# Patient Record
Sex: FEMALE | Race: WHITE | Employment: OTHER | URBAN - NONMETROPOLITAN AREA
[De-identification: names, ages, dates, MRNs, and addresses within clinical notes are randomized per-mention and may not be internally consistent; named-entity substitution may affect disease eponyms.]

---

## 2017-07-27 ENCOUNTER — INITIAL CONSULT (OUTPATIENT)
Dept: PULMONOLOGY | Age: 57
End: 2017-07-27
Payer: COMMERCIAL

## 2017-07-27 VITALS
BODY MASS INDEX: 25.6 KG/M2 | HEIGHT: 70 IN | HEART RATE: 56 BPM | OXYGEN SATURATION: 98 % | WEIGHT: 178.8 LBS | SYSTOLIC BLOOD PRESSURE: 108 MMHG | DIASTOLIC BLOOD PRESSURE: 62 MMHG

## 2017-07-27 DIAGNOSIS — F51.01 PRIMARY INSOMNIA: Primary | ICD-10-CM

## 2017-07-27 DIAGNOSIS — G47.33 OSA (OBSTRUCTIVE SLEEP APNEA): ICD-10-CM

## 2017-07-27 DIAGNOSIS — G47.19 EXCESSIVE DAYTIME SLEEPINESS: ICD-10-CM

## 2017-07-27 DIAGNOSIS — R06.83 SNORING: ICD-10-CM

## 2017-07-27 DIAGNOSIS — G47.8 NON-RESTORATIVE SLEEP: ICD-10-CM

## 2017-07-27 DIAGNOSIS — G47.10 HYPERSOMNIA: ICD-10-CM

## 2017-07-27 DIAGNOSIS — R53.83 FATIGUE, UNSPECIFIED TYPE: ICD-10-CM

## 2017-07-27 PROCEDURE — 99204 OFFICE O/P NEW MOD 45 MIN: CPT | Performed by: INTERNAL MEDICINE

## 2017-08-17 ENCOUNTER — HOSPITAL ENCOUNTER (OUTPATIENT)
Dept: SLEEP CENTER | Age: 57
Discharge: HOME | End: 2017-08-17
Payer: COMMERCIAL

## 2017-08-17 VITALS
RESPIRATION RATE: 98 BRPM | WEIGHT: 178 LBS | SYSTOLIC BLOOD PRESSURE: 108 MMHG | DIASTOLIC BLOOD PRESSURE: 62 MMHG | HEART RATE: 56 BPM | BODY MASS INDEX: 25.48 KG/M2 | HEIGHT: 70 IN

## 2017-08-17 DIAGNOSIS — G47.33 OSA (OBSTRUCTIVE SLEEP APNEA): ICD-10-CM

## 2017-08-17 DIAGNOSIS — G47.8 NON-RESTORATIVE SLEEP: ICD-10-CM

## 2017-08-17 DIAGNOSIS — R06.83 SNORING: ICD-10-CM

## 2017-08-17 DIAGNOSIS — G47.10 HYPERSOMNIA: ICD-10-CM

## 2017-08-17 DIAGNOSIS — R53.83 FATIGUE, UNSPECIFIED TYPE: ICD-10-CM

## 2017-08-17 DIAGNOSIS — F51.01 PRIMARY INSOMNIA: ICD-10-CM

## 2017-08-17 DIAGNOSIS — G47.19 EXCESSIVE DAYTIME SLEEPINESS: ICD-10-CM

## 2017-08-17 PROCEDURE — 95810 POLYSOM 6/> YRS 4/> PARAM: CPT

## 2017-08-18 LAB — STATUS: NORMAL

## 2017-08-18 PROCEDURE — 95810 POLYSOM 6/> YRS 4/> PARAM: CPT | Performed by: INTERNAL MEDICINE

## 2017-08-24 ENCOUNTER — OFFICE VISIT (OUTPATIENT)
Dept: PULMONOLOGY | Age: 57
End: 2017-08-24
Payer: COMMERCIAL

## 2017-08-24 VITALS
HEART RATE: 72 BPM | HEIGHT: 70 IN | DIASTOLIC BLOOD PRESSURE: 70 MMHG | OXYGEN SATURATION: 98 % | WEIGHT: 178 LBS | SYSTOLIC BLOOD PRESSURE: 92 MMHG | BODY MASS INDEX: 25.48 KG/M2

## 2017-08-24 DIAGNOSIS — F51.01 PRIMARY INSOMNIA: Primary | ICD-10-CM

## 2017-08-24 PROCEDURE — 99212 OFFICE O/P EST SF 10 MIN: CPT | Performed by: INTERNAL MEDICINE

## 2017-12-22 ENCOUNTER — OFFICE VISIT (OUTPATIENT)
Dept: PSYCHOLOGY | Age: 57
End: 2017-12-22
Payer: COMMERCIAL

## 2017-12-22 DIAGNOSIS — F51.01 PRIMARY INSOMNIA: Chronic | ICD-10-CM

## 2017-12-22 PROBLEM — G47.00 INSOMNIA: Chronic | Status: ACTIVE | Noted: 2017-12-22

## 2017-12-22 PROCEDURE — 90791 PSYCH DIAGNOSTIC EVALUATION: CPT | Performed by: PSYCHOLOGIST

## 2017-12-22 ASSESSMENT — ANXIETY QUESTIONNAIRES
4. TROUBLE RELAXING: 1-SEVERAL DAYS
GAD7 TOTAL SCORE: 7
6. BECOMING EASILY ANNOYED OR IRRITABLE: 1-SEVERAL DAYS
3. WORRYING TOO MUCH ABOUT DIFFERENT THINGS: 1-SEVERAL DAYS
5. BEING SO RESTLESS THAT IT IS HARD TO SIT STILL: 1-SEVERAL DAYS
7. FEELING AFRAID AS IF SOMETHING AWFUL MIGHT HAPPEN: 0-NOT AT ALL SURE
2. NOT BEING ABLE TO STOP OR CONTROL WORRYING: 1-SEVERAL DAYS
1. FEELING NERVOUS, ANXIOUS, OR ON EDGE: 2-OVER HALF THE DAYS

## 2017-12-22 ASSESSMENT — PATIENT HEALTH QUESTIONNAIRE - PHQ9
6. FEELING BAD ABOUT YOURSELF - OR THAT YOU ARE A FAILURE OR HAVE LET YOURSELF OR YOUR FAMILY DOWN: 1
SUM OF ALL RESPONSES TO PHQ9 QUESTIONS 1 & 2: 3
2. FEELING DOWN, DEPRESSED OR HOPELESS: 2
8. MOVING OR SPEAKING SO SLOWLY THAT OTHER PEOPLE COULD HAVE NOTICED. OR THE OPPOSITE, BEING SO FIGETY OR RESTLESS THAT YOU HAVE BEEN MOVING AROUND A LOT MORE THAN USUAL: 0
5. POOR APPETITE OR OVEREATING: 3
1. LITTLE INTEREST OR PLEASURE IN DOING THINGS: 1
4. FEELING TIRED OR HAVING LITTLE ENERGY: 3
7. TROUBLE CONCENTRATING ON THINGS, SUCH AS READING THE NEWSPAPER OR WATCHING TELEVISION: 2
SUM OF ALL RESPONSES TO PHQ QUESTIONS 1-9: 16
10. IF YOU CHECKED OFF ANY PROBLEMS, HOW DIFFICULT HAVE THESE PROBLEMS MADE IT FOR YOU TO DO YOUR WORK, TAKE CARE OF THINGS AT HOME, OR GET ALONG WITH OTHER PEOPLE: 1
9. THOUGHTS THAT YOU WOULD BE BETTER OFF DEAD, OR OF HURTING YOURSELF: 1
3. TROUBLE FALLING OR STAYING ASLEEP: 3

## 2017-12-22 NOTE — PROGRESS NOTES
[facility]  [address]  [address] [place] [address]  Bl[address]  Dept: [phone]  Dept Fax: [phone]: [phone]    Patient:  Daniel Terry  Visit Date: 12/22/2017  Referring Provider:   No ref. provider found    SUBJECTIVE DATA     CHIEF COMPLAINT:    Chief Complaint   Patient presents with    Anxiety    Insomnia    Depression    New Patient       History obtained from: patient    HISTORY OF PRESENT ILLNESS:    Daniel Terry is a 61 y.o. female who presents to the office for insomnia of long duration. She has a number of stressors, which we discussed.     HPI    REVIEW OF SYSTEMS:    Review of Systems    PSYCHIATRIC HISTORY:    Patient has had prior care with the following:    [] Psychiatrist    [] Psychologist    [] Other Therapist    [x] None    Patient reports 0 psych hospital admissions    Past psychiatric medications include: Lunesta, Ambien, briefly on antidepressant (not known which)    Adverse reactions from psychotropic medications:    Feeling groggy    Additional Neurological and Psychological Symptoms         Chronic pain: No      Obsessions and Compulsions: No     Dissociation:  No     History of Concussion: No     Sleep Problems:  Yes     [x] Can't fall asleep    [x] Can't stay asleep    [x] Snoring    [x] Restless leg    SOCIAL HISTORY:  Patient was born in [address] [place] [address] and raised by her biological parents      Social History     Social History    Marital status:  30+ years     Spouse name: Justin Mayfield Number of children: 3 daughters    Years of education: N/A     Occupational History    Day care worker, 25 years     Social History Main Topics    Smoking status: Never Smoker    Smokeless tobacco: Never Used    Alcohol use 2.4 oz/week     4 Glasses of wine per week      Comment: occasionally    Drug use: No    Sexual activity: Not on file     Other Topics Concern    Not on file     Social History Narrative    No narrative on file constricted, dysthymic and sad      Affect:  blunted and flat      Appearance:  Normal   Activity:  Normal   Gait/Posture: Normal   Speech:  soft   Thought Process:  within normal limits   Thought Content: Within Normal Limits   Cognition:  Normal   Memory: Normal   Insight:  Normal   Judgment: Normal   Suicidal Ideations: Denies Suicidal Ideations   Homicidal Ideations: Denies Homicidal Ideations   Medication Side Effects: None Reported       Attention Span Within Normal Limits     Screenings Completed in This Encounter:   HEIDI-7  Feeling nervious, anxious, or on edge: 2-Over half the days  Not able to stop or control worryin-Several days  Worrying too much about different things: 1-Several days  Trouble relaxin-Several days  Being so restless that it's hard to sit still: 1-Several days  Becoming easily annoyed or irritable: 1-Several days  Feeling afraid as if something awful might happen: 0-Not at all sure  HEIDI-7 Total Score: 7    PHQ-2 Over the past 2 weeks, how often have you been bothered by any of the following problems? Little interest or pleasure in doing things: Several days  Feeling down, depressed, or hopeless: More than half the days  PHQ-2 Score: 3  PHQ-9 Over the past 2 weeks, how often have you been bothered by any of the following problems? Trouble falling or staying asleep, or sleeping too much: Nearly every day  Feeling tired or having little energy: Nearly every day  Poor appetite or overeating: Nearly every day (\"comfort eating\")  Feeling bad about yourself - or that you are a failure or have let yourself or your family down: Several days  Trouble concentrating on things, such as reading the newspaper or watching television: More than half the days  Moving or speaking so slowly that other people could have noticed.  Or the opposite - being so fidgety or restless that you have been moving around a lot more than usual: Not at all  Thoughts that you would be better off dead, or of hurting

## 2018-02-02 ENCOUNTER — OFFICE VISIT (OUTPATIENT)
Dept: PSYCHOLOGY | Age: 58
End: 2018-02-02
Payer: COMMERCIAL

## 2018-02-02 DIAGNOSIS — F51.01 PRIMARY INSOMNIA: Primary | Chronic | ICD-10-CM

## 2018-02-02 PROCEDURE — 90834 PSYTX W PT 45 MINUTES: CPT | Performed by: PSYCHOLOGIST

## 2018-02-02 NOTE — PROGRESS NOTES
[facility]  [address]  [address]  Dept: [phone]  Dept Fax: [phone]  Loc: [phone]    Patient:  Nano Vides  Visit Date: 2/2/2018      SUBJECTIVE DATA     CHIEF COMPLAINT:    Chief Complaint   Patient presents with    Insomnia       Patient update:  Patient reports the following since our last session: She has been charting her sleep and reading the 7-bites book. Notices that she has been getting more hours of sleep per night on average, but on nights when she has conflict with her , she can go for five or six hours without sleeping, before sleeping just two or three hours. Barriers to optimal progress (depression, anxiety, fatigue, insomnia focus, stress at home      OBJECTIVE DATA     Physical Exam:    Mental Status Evaluation:   Orientation: Alert, oriented, thought content appropriate   Mood:. anxious      Affect:  constricted and anxious      Appearance:  Normal   Activity:  Normal   Gait/Posture: Normal   Speech:  Normal   Thought Process:  within normal limits   Thought Content: Within Normal Limits   Cognition:  Normal   Memory: Normal   Insight:  fair   Judgment: Normal   Suicidal Ideations: Denies Suicidal Ideations   Homicidal Ideations: Denies Homicidal Ideations   Medication Side Effects: None Reported       Attention Span Within Normal Limits     Screenings Completed in This Encounter:                                   Interventions Completed in this Encounter:  Discussion of emotional impacts of chronic insomnia, and how it has affected the patient's life., Discussion of [facility] Sleep Diary (JHSD) and review of sample patient charts., Introduction to fundamental principles of Cognitive Behavioral Therapy for Insomnia (CBT-I). , Work on [facility] of insomnia development and conditioning as they apply to chronic insomnia., Relaxation training in session.  and Discussion of melatonin, bright light therapy, and altering blue light screen exposure on all devices. DIAGNOSIS AND ASSESSMENT DATA     DIAGNOSIS:   1. Primary insomnia        PLAN   Follow-up:  No Follow-up on file. Go to Patient Instructions section for patient homework    Homework assignment before next session:    [] Put blue light evelio or filter on all computers and devices as discussed in session. Popular choices: \"F.lux\" for computers, \"Night Shift\" for Apple devices, \"Twilight\" or other apps for android devices. [] If you really want to watch TV before bed, it's better to do that in a room that is not your bedroom. Also, position yourself to get less of the blue light, as we talked about in session. [] Preferably, in the last hours before bedtime, turn off electronic devices and read, look at magazines, listen to music, do stretching, etc.    [] Practice deep breathing 1-2 times per day for 15 minutes, as demonstrated in session, and/or:    [] Go to [facility]\" web site and begin practicing with their free meditation podcasts    [x] Notice any thoughts or events that you think increase your insomnia, your anxiety, or your depression. You can also use \"Moodtracker. com\" or similar apps to monitor your mood, sleep, and anxiety. [] Go to \"[facility]\" web site, open up the \"Workbooks\" tab, and select a problem from the list that suits your needs. Review the first module. [] Begin to track your physical activity. Even five minutes per day will be helpful. [x] Talk with your physician about whether it's OK to start fish oil (burpless) or flax oil, 1000 to 1200 mg per day    [] Consider starting Bright Light Therapy for 30 minutes in the morning.  This works to consolidate and deepen your sleep, and improve your alertness during the day    [] Try \"Expressive Writing\" using the guidelines on the handout    [x] If you feel ready, start reading one of the recommended insomnia books as we discussed in session    [] If you feel suicidal or have thoughts of harming yourself or others, please call 911 or proceed to the nearest emergency department immediately. Total time spent with patient:  50 minutes    All questions about treatment plan answered. Patient instructed to go immediately to the emergency room and/or call 911 if any suicidal or homicidal ideations. Patient stated understanding and is agreeable to treatment and crisis plan.         Provider Signature:  Electronically signed by Alanna Alvarenga, PhD on 2/2/2018 at 9:07 AM

## 2018-02-16 ENCOUNTER — OFFICE VISIT (OUTPATIENT)
Dept: PSYCHOLOGY | Age: 58
End: 2018-02-16
Payer: COMMERCIAL

## 2018-02-16 DIAGNOSIS — F51.01 PRIMARY INSOMNIA: Primary | Chronic | ICD-10-CM

## 2018-02-16 PROCEDURE — 90837 PSYTX W PT 60 MINUTES: CPT | Performed by: PSYCHOLOGIST

## 2018-02-16 NOTE — PROGRESS NOTES
[facility]  [address]  [address] [place] [address] [address] [address]  Dept: [phone]  Dept Fax: [phone]  Loc: [phone]    Patient:  Ailin Villalobos  Visit Date: 2/16/2018      SUBJECTIVE DATA     CHIEF COMPLAINT:    Chief Complaint   Patient presents with    Anxiety    Insomnia       Patient update:  Patient reports the following since our last session: She has been charting her sleep and reading the FRUCT book. Sleep remains very fragmented. Much of the difficulty with sleep relates to interpersonal stressors, which leave her feeling frustrated and tense. Extended session due to discussion of these issues. Barriers to optimal progress (depression, anxiety, fatigue, insomnia focus, stress at home, constantly feeling criticized, lack of self-assertion)      OBJECTIVE DATA     Physical Exam:    Mental Status Evaluation:   Orientation: Alert, oriented, thought content appropriate   Mood:. anxious      Affect:  constricted and anxious      Appearance:  Normal   Activity:  Normal   Gait/Posture: Normal   Speech:  Normal   Thought Process:  within normal limits   Thought Content: Within Normal Limits   Cognition:  Normal   Memory: Normal   Insight:  fair   Judgment: Normal   Suicidal Ideations: Denies Suicidal Ideations   Homicidal Ideations: Denies Homicidal Ideations   Medication Side Effects: None Reported       Attention Span Within Normal Limits     Screenings Completed in This Encounter:                                   Interventions Completed in this Encounter:  Discussion of emotional impacts of chronic insomnia, and how it has affected the patient's life., Discussion of [facility] Sleep Diary (JHSD) and review of sample patient charts., Introduction to fundamental principles of Cognitive Behavioral Therapy for Insomnia (CBT-I). , Work on [facility] of insomnia development and conditioning as they apply to chronic insomnia., Relaxation training

## 2018-03-28 ENCOUNTER — OFFICE VISIT (OUTPATIENT)
Dept: PSYCHOLOGY | Age: 58
End: 2018-03-28
Payer: COMMERCIAL

## 2018-03-28 DIAGNOSIS — F51.01 PRIMARY INSOMNIA: Primary | Chronic | ICD-10-CM

## 2018-03-28 PROCEDURE — 90837 PSYTX W PT 60 MINUTES: CPT | Performed by: PSYCHOLOGIST

## 2018-03-28 NOTE — PROGRESS NOTES
[facility]  [address]  [address] [place] [address] [address] [address]  Dept: [phone]  Dept Fax: [phone]  Loc: [phone]    Patient:  Vira Smith  Visit Date: 3/28/2018      SUBJECTIVE DATA     CHIEF COMPLAINT:    Chief Complaint   Patient presents with    Anxiety    Insomnia       Patient update:  Patient reports the following since our last session: has had a bad UTI with incontinence, frequency, urgency, fever, chills, vertigo, falling down, nausea and vomiting. Has been trying to deal with it with cranberry juice and Tylenol, rather than going to see Dr. Lauren Quan. Agrees to set up an appointment with Dr. Lauren Quan to deal with the UTI. Also wondering whether the adjustable Sleep Number bed would be good for her, with her history of snoring and pain and insomnia. Lots of difficulty with self-assertion. We attempted to do a brief role play of her sticking up for something that is important to her, and she became immediately flushed, overwhelmed, and tearful. Wants to spend some time with daughter Wilton Hou seldom do fun things together, although they both enjoy doing the Sloan Ing series on the computer. Has difficulty identifying ways to increase her positive activities. A major barrier to doing things is her 's insistence that they do things together when she is not working. Has joined [employer]--wants to go 2-3 times a week, for about 60-90 minutes. Going to the [facility] until then. Barriers to optimal progress (depression, anxiety, fatigue, insomnia focus, stress at home, constantly feeling criticized, lack of self-assertion).       OBJECTIVE DATA     Physical Exam:    Mental Status Evaluation:   Orientation: Alert, oriented, thought content appropriate   Mood:. anxious      Affect:  constricted and anxious, tearful      Appearance:  Normal   Activity:  Normal   Gait/Posture: Normal   Speech:  Normal   Thought Process:  within normal limits   Thought Content: Within Normal Limits   Cognition:  Normal   Memory: Normal   Insight:  fair   Judgment: Normal   Suicidal Ideations: Denies Suicidal Ideations   Homicidal Ideations: Denies Homicidal Ideations   Medication Side Effects: None Reported       Attention Span Within Normal Limits     Screenings Completed in This Encounter:                                   Interventions Completed in this Encounter:  Discussion of emotional impacts of chronic insomnia, and how it has affected the patient's life., Discussion of [facility] Sleep Diary (JHSD) and review of sample patient charts., Introduction to fundamental principles of Cognitive Behavioral Therapy for Insomnia (CBT-I). , Work on [facility] of insomnia development and conditioning as they apply to chronic insomnia., Relaxation training in session. and Discussion of melatonin, bright light therapy, and altering blue light screen exposure on all devices. Also discussion of importance of self-care, including aerobic exercise and having a balance of positive activities in one's life. DIAGNOSIS AND ASSESSMENT DATA     DIAGNOSIS:   1. Primary insomnia        PLAN   Follow-up:  No Follow-up on file. Go to Patient Instructions section for patient homework    Homework assignment before next session:    [] Put blue light evelio or filter on all computers and devices as discussed in session. Popular choices: \"F.lux\" for computers, \"Night Shift\" for Apple devices, \"Twilight\" or other apps for android devices. [] If you really want to watch TV before bed, it's better to do that in a room that is not your bedroom. Also, position yourself to get less of the blue light, as we talked about in session.      [] Preferably, in the last hours before bedtime, turn off electronic devices and read, look at magazines, listen to music, do stretching, etc.    [] Practice deep breathing 1-2 times per day for 15 minutes, as demonstrated in session,

## 2018-04-11 ENCOUNTER — OFFICE VISIT (OUTPATIENT)
Dept: PSYCHOLOGY | Age: 58
End: 2018-04-11
Payer: COMMERCIAL

## 2018-04-11 DIAGNOSIS — F51.01 PRIMARY INSOMNIA: Primary | Chronic | ICD-10-CM

## 2018-04-11 PROCEDURE — 90834 PSYTX W PT 45 MINUTES: CPT | Performed by: PSYCHOLOGIST

## 2018-04-11 RX ORDER — PREDNISONE 10 MG/1
TABLET ORAL
COMMUNITY
Start: 2018-04-10

## 2018-04-11 RX ORDER — LEVOFLOXACIN 500 MG/1
TABLET, FILM COATED ORAL
COMMUNITY
Start: 2018-04-10 | End: 2021-05-21

## 2018-04-11 RX ORDER — OSELTAMIVIR PHOSPHATE 75 MG/1
CAPSULE ORAL
COMMUNITY
Start: 2018-03-28

## 2018-04-11 RX ORDER — MINOCYCLINE HYDROCHLORIDE 100 MG/1
CAPSULE ORAL
COMMUNITY
Start: 2018-01-24 | End: 2021-05-21

## 2018-04-17 ENCOUNTER — HOSPITAL ENCOUNTER (OUTPATIENT)
Dept: GENERAL RADIOLOGY | Age: 58
Discharge: HOME | End: 2018-04-17
Payer: COMMERCIAL

## 2018-04-17 ENCOUNTER — HOSPITAL ENCOUNTER (OUTPATIENT)
Age: 58
Discharge: HOME | End: 2018-04-17
Payer: COMMERCIAL

## 2018-04-17 DIAGNOSIS — R05.9 COUGH: ICD-10-CM

## 2018-04-17 PROCEDURE — 71046 X-RAY EXAM CHEST 2 VIEWS: CPT

## 2018-08-20 ENCOUNTER — HOSPITAL ENCOUNTER (OUTPATIENT)
Dept: MAMMOGRAPHY | Age: 58
Discharge: HOME | End: 2018-08-20
Payer: COMMERCIAL

## 2018-08-20 DIAGNOSIS — Z12.39 SCREENING BREAST EXAMINATION: ICD-10-CM

## 2018-08-20 PROCEDURE — 77067 SCR MAMMO BI INCL CAD: CPT

## 2019-09-19 ENCOUNTER — HOSPITAL ENCOUNTER (OUTPATIENT)
Dept: MAMMOGRAPHY | Age: 59
Discharge: HOME | End: 2019-09-19
Payer: COMMERCIAL

## 2019-09-19 DIAGNOSIS — Z12.39 BREAST SCREENING: ICD-10-CM

## 2019-09-19 LAB
EKG ATRIAL RATE: 47 BPM
EKG P AXIS: 57 DEGREES
EKG P-R INTERVAL: 180 MS
EKG Q-T INTERVAL: 444 MS
EKG QRS DURATION: 78 MS
EKG QTC CALCULATION (BAZETT): 392 MS
EKG R AXIS: 61 DEGREES
EKG T AXIS: 42 DEGREES
EKG VENTRICULAR RATE: 47 BPM

## 2019-09-19 PROCEDURE — 93005 ELECTROCARDIOGRAM TRACING: CPT | Performed by: FAMILY MEDICINE

## 2019-09-19 PROCEDURE — 77063 BREAST TOMOSYNTHESIS BI: CPT

## 2019-10-31 ENCOUNTER — HOSPITAL ENCOUNTER (OUTPATIENT)
Dept: WOMENS IMAGING | Age: 59
Discharge: HOME | End: 2019-10-31
Payer: COMMERCIAL

## 2019-10-31 ENCOUNTER — HOSPITAL ENCOUNTER (OUTPATIENT)
Dept: INTERVENTIONAL RADIOLOGY/VASCULAR | Age: 59
Discharge: HOME | End: 2019-10-31
Payer: COMMERCIAL

## 2019-10-31 DIAGNOSIS — Z13.6 ENCOUNTER FOR SCREENING FOR VASCULAR DISEASE: ICD-10-CM

## 2019-10-31 DIAGNOSIS — Z13.820 OSTEOPOROSIS SCREENING: ICD-10-CM

## 2019-10-31 PROCEDURE — 9900000021 US VASCULAR SCREENING

## 2019-10-31 PROCEDURE — 77080 DXA BONE DENSITY AXIAL: CPT

## 2021-02-09 ENCOUNTER — HOSPITAL ENCOUNTER (OUTPATIENT)
Dept: MAMMOGRAPHY | Age: 61
Discharge: HOME | End: 2021-02-09
Payer: COMMERCIAL

## 2021-02-09 DIAGNOSIS — Z12.39 SCREENING BREAST EXAMINATION: ICD-10-CM

## 2021-02-09 PROCEDURE — 77063 BREAST TOMOSYNTHESIS BI: CPT

## 2022-02-10 ENCOUNTER — HOSPITAL ENCOUNTER (OUTPATIENT)
Dept: MAMMOGRAPHY | Age: 62
Discharge: HOME | End: 2022-01-19
Payer: COMMERCIAL

## 2022-02-10 DIAGNOSIS — Z12.39 BREAST SCREENING: ICD-10-CM

## 2022-02-10 PROCEDURE — 77063 BREAST TOMOSYNTHESIS BI: CPT

## 2023-03-13 ENCOUNTER — HOSPITAL ENCOUNTER (OUTPATIENT)
Dept: MAMMOGRAPHY | Age: 63
Discharge: HOME | End: 2023-03-13
Payer: COMMERCIAL

## 2023-03-13 ENCOUNTER — HOSPITAL ENCOUNTER (OUTPATIENT)
Age: 63
Discharge: HOME | End: 2023-03-13
Payer: COMMERCIAL

## 2023-03-13 ENCOUNTER — HOSPITAL ENCOUNTER (OUTPATIENT)
Dept: GENERAL RADIOLOGY | Age: 63
Discharge: HOME | End: 2023-03-13
Payer: COMMERCIAL

## 2023-03-13 DIAGNOSIS — M79.671 RIGHT FOOT PAIN: ICD-10-CM

## 2023-03-13 DIAGNOSIS — Z12.39 SCREENING BREAST EXAMINATION: ICD-10-CM

## 2023-03-13 PROCEDURE — 77067 SCR MAMMO BI INCL CAD: CPT

## 2023-03-13 PROCEDURE — 73630 X-RAY EXAM OF FOOT: CPT

## 2023-03-21 ENCOUNTER — HOSPITAL ENCOUNTER (OUTPATIENT)
Dept: WOMENS IMAGING | Age: 63
Discharge: HOME | End: 2023-03-21
Payer: COMMERCIAL

## 2023-03-21 ENCOUNTER — HOSPITAL ENCOUNTER (OUTPATIENT)
Dept: ULTRASOUND IMAGING | Age: 63
Discharge: HOME | End: 2023-03-21
Payer: COMMERCIAL

## 2023-03-21 DIAGNOSIS — Z78.0 POSTMENOPAUSAL: ICD-10-CM

## 2023-03-21 DIAGNOSIS — M85.80 OSTEOPENIA, UNSPECIFIED LOCATION: ICD-10-CM

## 2023-03-21 DIAGNOSIS — E04.9 ENLARGED THYROID: ICD-10-CM

## 2023-03-21 PROCEDURE — 76536 US EXAM OF HEAD AND NECK: CPT

## 2023-03-21 PROCEDURE — 77080 DXA BONE DENSITY AXIAL: CPT

## 2023-04-10 PROBLEM — E04.2 MULTIPLE THYROID NODULES: Status: ACTIVE | Noted: 2023-04-10

## 2023-04-10 PROBLEM — R93.89 ABNORMAL THYROID ULTRASOUND: Status: ACTIVE | Noted: 2023-04-10

## 2023-10-03 ENCOUNTER — HOSPITAL ENCOUNTER (OUTPATIENT)
Dept: GENERAL RADIOLOGY | Age: 63
Discharge: HOME | End: 2023-10-03
Payer: COMMERCIAL

## 2023-10-03 ENCOUNTER — HOSPITAL ENCOUNTER (OUTPATIENT)
Age: 63
Discharge: HOME | End: 2023-10-03
Payer: COMMERCIAL

## 2023-10-03 DIAGNOSIS — R20.0 NUMBNESS AND TINGLING OF BOTH FEET: ICD-10-CM

## 2023-10-03 DIAGNOSIS — M54.42 ACUTE MIDLINE LOW BACK PAIN WITH LEFT-SIDED SCIATICA: ICD-10-CM

## 2023-10-03 DIAGNOSIS — R20.2 NUMBNESS AND TINGLING OF BOTH FEET: ICD-10-CM

## 2023-10-03 PROCEDURE — 72100 X-RAY EXAM L-S SPINE 2/3 VWS: CPT

## 2024-02-27 ENCOUNTER — HOSPITAL ENCOUNTER (OUTPATIENT)
Age: 64
Discharge: HOME | End: 2024-02-27
Payer: COMMERCIAL

## 2024-02-27 ENCOUNTER — HOSPITAL ENCOUNTER (OUTPATIENT)
Dept: GENERAL RADIOLOGY | Age: 64
Discharge: HOME | End: 2024-02-27
Payer: COMMERCIAL

## 2024-02-27 DIAGNOSIS — M25.561 ACUTE PAIN OF BOTH KNEES: ICD-10-CM

## 2024-02-27 DIAGNOSIS — M25.562 ACUTE PAIN OF BOTH KNEES: ICD-10-CM

## 2024-02-27 PROCEDURE — 73564 X-RAY EXAM KNEE 4 OR MORE: CPT

## 2024-06-24 ENCOUNTER — HOSPITAL ENCOUNTER (OUTPATIENT)
Dept: MAMMOGRAPHY | Age: 64
Discharge: HOME | End: 2024-06-24
Payer: COMMERCIAL

## 2024-06-24 VITALS — WEIGHT: 224 LBS | HEIGHT: 70 IN | BODY MASS INDEX: 32.07 KG/M2

## 2024-06-24 DIAGNOSIS — Z12.39 BREAST SCREENING: ICD-10-CM

## 2024-06-24 PROCEDURE — 77063 BREAST TOMOSYNTHESIS BI: CPT

## 2024-07-17 ENCOUNTER — LAB (OUTPATIENT)
Dept: LAB | Age: 64
End: 2024-07-17

## 2024-07-25 LAB — CYTOLOGY THIN PREP PAP: NORMAL

## 2024-09-12 ENCOUNTER — OFFICE VISIT (OUTPATIENT)
Dept: PULMONOLOGY | Age: 64
End: 2024-09-12
Payer: MEDICARE

## 2024-09-12 VITALS
HEART RATE: 62 BPM | WEIGHT: 223.2 LBS | OXYGEN SATURATION: 96 % | HEIGHT: 69 IN | SYSTOLIC BLOOD PRESSURE: 108 MMHG | TEMPERATURE: 97.5 F | DIASTOLIC BLOOD PRESSURE: 62 MMHG | BODY MASS INDEX: 33.06 KG/M2

## 2024-09-12 DIAGNOSIS — R40.0 DAYTIME SLEEPINESS: Primary | ICD-10-CM

## 2024-09-12 DIAGNOSIS — R06.83 HABITUAL SNORING: ICD-10-CM

## 2024-09-12 DIAGNOSIS — E66.9 CLASS 1 OBESITY WITH BODY MASS INDEX (BMI) OF 30.0 TO 30.9 IN ADULT, UNSPECIFIED OBESITY TYPE, UNSPECIFIED WHETHER SERIOUS COMORBIDITY PRESENT: ICD-10-CM

## 2024-09-12 DIAGNOSIS — Z72.820 POOR SLEEP: ICD-10-CM

## 2024-09-12 PROCEDURE — G8427 DOCREV CUR MEDS BY ELIG CLIN: HCPCS | Performed by: NURSE PRACTITIONER

## 2024-09-12 PROCEDURE — G8417 CALC BMI ABV UP PARAM F/U: HCPCS | Performed by: NURSE PRACTITIONER

## 2024-09-12 PROCEDURE — 1036F TOBACCO NON-USER: CPT | Performed by: NURSE PRACTITIONER

## 2024-09-12 PROCEDURE — 3017F COLORECTAL CA SCREEN DOC REV: CPT | Performed by: NURSE PRACTITIONER

## 2024-09-12 PROCEDURE — 99214 OFFICE O/P EST MOD 30 MIN: CPT | Performed by: NURSE PRACTITIONER

## 2024-09-12 RX ORDER — ZOLPIDEM TARTRATE 10 MG/1
10 TABLET ORAL ONCE
Qty: 1 TABLET | Refills: 0 | Status: SHIPPED | OUTPATIENT
Start: 2024-09-12 | End: 2024-09-12

## 2024-09-12 ASSESSMENT — ENCOUNTER SYMPTOMS
GASTROINTESTINAL NEGATIVE: 1
RESPIRATORY NEGATIVE: 1
WHEEZING: 0
COUGH: 0
ALLERGIC/IMMUNOLOGIC NEGATIVE: 1
EYES NEGATIVE: 1
SHORTNESS OF BREATH: 0

## 2024-09-22 ENCOUNTER — HOSPITAL ENCOUNTER (OUTPATIENT)
Dept: SLEEP CENTER | Age: 64
Discharge: HOME | End: 2024-09-24
Payer: MEDICARE

## 2024-09-22 DIAGNOSIS — Z72.820 POOR SLEEP: ICD-10-CM

## 2024-09-22 DIAGNOSIS — R40.0 DAYTIME SLEEPINESS: ICD-10-CM

## 2024-09-22 DIAGNOSIS — E66.9 CLASS 1 OBESITY WITH BODY MASS INDEX (BMI) OF 30.0 TO 30.9 IN ADULT, UNSPECIFIED OBESITY TYPE, UNSPECIFIED WHETHER SERIOUS COMORBIDITY PRESENT: ICD-10-CM

## 2024-09-22 DIAGNOSIS — R06.83 HABITUAL SNORING: ICD-10-CM

## 2024-09-22 PROCEDURE — 95810 POLYSOM 6/> YRS 4/> PARAM: CPT

## 2024-10-18 ENCOUNTER — OFFICE VISIT (OUTPATIENT)
Dept: PULMONOLOGY | Age: 64
End: 2024-10-18
Payer: MEDICARE

## 2024-10-18 VITALS
WEIGHT: 225.4 LBS | TEMPERATURE: 97.5 F | HEIGHT: 69 IN | HEART RATE: 65 BPM | OXYGEN SATURATION: 98 % | BODY MASS INDEX: 33.38 KG/M2 | DIASTOLIC BLOOD PRESSURE: 64 MMHG | SYSTOLIC BLOOD PRESSURE: 110 MMHG

## 2024-10-18 DIAGNOSIS — R40.0 DAYTIME SLEEPINESS: Primary | ICD-10-CM

## 2024-10-18 DIAGNOSIS — R06.83 HABITUAL SNORING: ICD-10-CM

## 2024-10-18 DIAGNOSIS — E66.811 CLASS 1 OBESITY WITH BODY MASS INDEX (BMI) OF 30.0 TO 30.9 IN ADULT, UNSPECIFIED OBESITY TYPE, UNSPECIFIED WHETHER SERIOUS COMORBIDITY PRESENT: ICD-10-CM

## 2024-10-18 DIAGNOSIS — G47.00 INSOMNIA, UNSPECIFIED TYPE: ICD-10-CM

## 2024-10-18 DIAGNOSIS — G25.81 RLS (RESTLESS LEGS SYNDROME): ICD-10-CM

## 2024-10-18 PROCEDURE — G8427 DOCREV CUR MEDS BY ELIG CLIN: HCPCS | Performed by: NURSE PRACTITIONER

## 2024-10-18 PROCEDURE — G8484 FLU IMMUNIZE NO ADMIN: HCPCS | Performed by: NURSE PRACTITIONER

## 2024-10-18 PROCEDURE — 3017F COLORECTAL CA SCREEN DOC REV: CPT | Performed by: NURSE PRACTITIONER

## 2024-10-18 PROCEDURE — 99213 OFFICE O/P EST LOW 20 MIN: CPT | Performed by: NURSE PRACTITIONER

## 2024-10-18 PROCEDURE — G8417 CALC BMI ABV UP PARAM F/U: HCPCS | Performed by: NURSE PRACTITIONER

## 2024-10-18 PROCEDURE — 1036F TOBACCO NON-USER: CPT | Performed by: NURSE PRACTITIONER

## 2024-10-18 PROCEDURE — 1090F PRES/ABSN URINE INCON ASSESS: CPT | Performed by: NURSE PRACTITIONER

## 2024-10-18 PROCEDURE — 1123F ACP DISCUSS/DSCN MKR DOCD: CPT | Performed by: NURSE PRACTITIONER

## 2024-10-18 PROCEDURE — G8399 PT W/DXA RESULTS DOCUMENT: HCPCS | Performed by: NURSE PRACTITIONER

## 2024-10-18 RX ORDER — TRAZODONE HYDROCHLORIDE 50 MG/1
50 TABLET, FILM COATED ORAL NIGHTLY
Qty: 90 TABLET | Refills: 3 | Status: SHIPPED | OUTPATIENT
Start: 2024-10-18 | End: 2024-10-18

## 2024-10-18 RX ORDER — TRAZODONE HYDROCHLORIDE 50 MG/1
50 TABLET, FILM COATED ORAL NIGHTLY
Qty: 90 TABLET | Refills: 0 | Status: SHIPPED | OUTPATIENT
Start: 2024-10-18

## 2024-10-18 ASSESSMENT — ENCOUNTER SYMPTOMS
SHORTNESS OF BREATH: 0
GASTROINTESTINAL NEGATIVE: 1
COUGH: 0
WHEEZING: 0
RESPIRATORY NEGATIVE: 1
EYES NEGATIVE: 1
ALLERGIC/IMMUNOLOGIC NEGATIVE: 1

## 2024-10-18 NOTE — PROGRESS NOTES
insomnia issues   -She will call the office if this medication doesn't work well for her.   - Recommend 7-9 hours of sleep   -She call my office for earlier appointment if needed for worsening of sleep symptoms.   - She was instructed on weight loss  - Marah was educated about my impression and plan. Patient verbalizesunderstanding.  We will see Marah Mcintyre back in: 2 months with download    Information added by my medical assistant/LPN was reviewed today     Electronically signed by HEAVENLY Howard CNP on 10/18/2024 at 9:55 AM

## 2025-02-11 ENCOUNTER — OFFICE VISIT (OUTPATIENT)
Dept: PULMONOLOGY | Age: 65
End: 2025-02-11
Payer: MEDICARE

## 2025-02-11 VITALS
WEIGHT: 223.8 LBS | OXYGEN SATURATION: 98 % | HEART RATE: 70 BPM | SYSTOLIC BLOOD PRESSURE: 118 MMHG | BODY MASS INDEX: 33.15 KG/M2 | TEMPERATURE: 97.5 F | DIASTOLIC BLOOD PRESSURE: 80 MMHG | HEIGHT: 69 IN

## 2025-02-11 DIAGNOSIS — G47.61 PLMD (PERIODIC LIMB MOVEMENT DISORDER): Primary | ICD-10-CM

## 2025-02-11 DIAGNOSIS — G47.00 INSOMNIA, UNSPECIFIED TYPE: ICD-10-CM

## 2025-02-11 PROCEDURE — 1090F PRES/ABSN URINE INCON ASSESS: CPT | Performed by: NURSE PRACTITIONER

## 2025-02-11 PROCEDURE — 99214 OFFICE O/P EST MOD 30 MIN: CPT | Performed by: NURSE PRACTITIONER

## 2025-02-11 PROCEDURE — G8427 DOCREV CUR MEDS BY ELIG CLIN: HCPCS | Performed by: NURSE PRACTITIONER

## 2025-02-11 PROCEDURE — 1123F ACP DISCUSS/DSCN MKR DOCD: CPT | Performed by: NURSE PRACTITIONER

## 2025-02-11 PROCEDURE — G8399 PT W/DXA RESULTS DOCUMENT: HCPCS | Performed by: NURSE PRACTITIONER

## 2025-02-11 PROCEDURE — 1036F TOBACCO NON-USER: CPT | Performed by: NURSE PRACTITIONER

## 2025-02-11 PROCEDURE — G8417 CALC BMI ABV UP PARAM F/U: HCPCS | Performed by: NURSE PRACTITIONER

## 2025-02-11 PROCEDURE — 3017F COLORECTAL CA SCREEN DOC REV: CPT | Performed by: NURSE PRACTITIONER

## 2025-02-11 RX ORDER — TRAZODONE HYDROCHLORIDE 50 MG/1
50 TABLET, FILM COATED ORAL NIGHTLY
Qty: 90 TABLET | Refills: 1 | Status: SHIPPED | OUTPATIENT
Start: 2025-02-11

## 2025-02-11 RX ORDER — SERINE 100 %
CRYSTALS MISCELLANEOUS
COMMUNITY

## 2025-02-11 NOTE — PROGRESS NOTES
[facility]    : GABRIELLE RAPP, 64 y.o.   : 1960  2025    Pt of Dr. Escalera     Subjective     Chief Complaint   Patient presents with    Follow-up     3 month daytime sleepiness follow up/medication check.         HPI  Gabrielle is here for follow up for insomnia follow up   Previous pt of Cherrie Conroy, ERICKA   PSG , neg for sleep apnea   .Some RLS been using frankincense at night on feet helping a bit   Previously tried Tylenol PM and Melatonin to sleep   Currently on trazodone 25 mg PO nightly - is noticing benefit with use.  Does not tolerate 50 mg Po due to morning sleepiness  Some nights still has difficulty getting her mind to shut down to fall asleep   Does have a sleep evelio and uses fan at night for the noise       PSG - AHI 1.7-at that time she had good sleep efficacy and 24% REM  PSG  - good sleep efficacy , neg for sleep apnea - PLMS noted on study 38/hr, 4.4/hr w/arousals      Neck Circumference -   14.5 inches  Mallampati - 1    Progress History:     Since last visit any new medical issues?no       Past Medical hx   PMH:  Past Medical History:   Diagnosis Date    Anxiety     Vertigo      SURGICAL HISTORY:  Past Surgical History:   Procedure Laterality Date    BREAST BIOPSY Right     benign    BREAST BIOPSY Left 10/11/2006    negative results    COLONOSCOPY      DILATION AND CURETTAGE OF UTERUS       BIOPSY THYROID  2023    US THYROID BIOPSY     SOCIAL HISTORY:  Social History     Tobacco Use    Smoking status: Never    Smokeless tobacco: Never   Substance Use Topics    Alcohol use: Yes     Alcohol/week: 4.0 standard drinks of alcohol     Types: 4 Glasses of wine per week     Comment: seldom    Drug use: No     ALLERGIES:  Allergies   Allergen Reactions    Bactrim [Sulfamethoxazole-Trimethoprim] Rash    Sulfa Antibiotics     Cefuroxime Axetil Rash     FAMILY HISTORY:  Family History   Problem Relation Age of Onset    Diabetes Mother

## 2025-02-11 NOTE — PATIENT INSTRUCTIONS
The recommended dosage of magnesium for RLS varies depending on individual needs. Typically, 400-600 mg of magnesium per day is considered appropriate. It can be taken in the form of oral supplements, such as magnesium oxide or magnesium citrate     Can try magnesium muscle oil or cream on legs at night   Warm bathes before bed with epson salt     Can try adding Melatonin with your trazodone at night . If this does not help , try again to increase to 1 full tablet of trazodone.

## 2025-08-14 ENCOUNTER — HOSPITAL ENCOUNTER (OUTPATIENT)
Dept: MAMMOGRAPHY | Age: 65
Discharge: HOME | End: 2025-08-14
Payer: MEDICARE

## 2025-08-14 DIAGNOSIS — Z12.39 BREAST SCREENING: ICD-10-CM

## 2025-08-14 PROCEDURE — 77063 BREAST TOMOSYNTHESIS BI: CPT

## 2025-08-29 ENCOUNTER — OFFICE VISIT (OUTPATIENT)
Age: 65
End: 2025-08-29
Payer: MEDICARE

## 2025-08-29 VITALS
HEIGHT: 69 IN | TEMPERATURE: 97.5 F | OXYGEN SATURATION: 97 % | HEART RATE: 54 BPM | DIASTOLIC BLOOD PRESSURE: 78 MMHG | SYSTOLIC BLOOD PRESSURE: 126 MMHG | WEIGHT: 224 LBS | BODY MASS INDEX: 33.18 KG/M2

## 2025-08-29 DIAGNOSIS — F51.4 NIGHT TERRORS, ADULT: ICD-10-CM

## 2025-08-29 DIAGNOSIS — R06.83 SNORING: ICD-10-CM

## 2025-08-29 DIAGNOSIS — G47.00 INSOMNIA, UNSPECIFIED TYPE: Primary | ICD-10-CM

## 2025-08-29 PROCEDURE — G8399 PT W/DXA RESULTS DOCUMENT: HCPCS | Performed by: NURSE PRACTITIONER

## 2025-08-29 PROCEDURE — 1123F ACP DISCUSS/DSCN MKR DOCD: CPT | Performed by: NURSE PRACTITIONER

## 2025-08-29 PROCEDURE — 1090F PRES/ABSN URINE INCON ASSESS: CPT | Performed by: NURSE PRACTITIONER

## 2025-08-29 PROCEDURE — G8417 CALC BMI ABV UP PARAM F/U: HCPCS | Performed by: NURSE PRACTITIONER

## 2025-08-29 PROCEDURE — G8427 DOCREV CUR MEDS BY ELIG CLIN: HCPCS | Performed by: NURSE PRACTITIONER

## 2025-08-29 PROCEDURE — 99214 OFFICE O/P EST MOD 30 MIN: CPT | Performed by: NURSE PRACTITIONER

## 2025-08-29 PROCEDURE — 3017F COLORECTAL CA SCREEN DOC REV: CPT | Performed by: NURSE PRACTITIONER

## 2025-08-29 PROCEDURE — 1036F TOBACCO NON-USER: CPT | Performed by: NURSE PRACTITIONER

## 2025-08-29 RX ORDER — DOXEPIN HYDROCHLORIDE 10 MG/1
10 CAPSULE ORAL NIGHTLY
Qty: 90 CAPSULE | Refills: 1 | Status: SHIPPED | OUTPATIENT
Start: 2025-08-29